# Patient Record
Sex: MALE | Race: WHITE | NOT HISPANIC OR LATINO | Employment: FULL TIME | URBAN - METROPOLITAN AREA
[De-identification: names, ages, dates, MRNs, and addresses within clinical notes are randomized per-mention and may not be internally consistent; named-entity substitution may affect disease eponyms.]

---

## 2018-01-03 DIAGNOSIS — G89.29 CHRONIC LEFT SHOULDER PAIN: Primary | ICD-10-CM

## 2018-01-03 DIAGNOSIS — M25.512 CHRONIC LEFT SHOULDER PAIN: Primary | ICD-10-CM

## 2018-01-04 ENCOUNTER — HOSPITAL ENCOUNTER (OUTPATIENT)
Dept: RADIOLOGY | Facility: HOSPITAL | Age: 23
Discharge: HOME | End: 2018-01-04
Attending: PHYSICIAN ASSISTANT
Payer: COMMERCIAL

## 2018-01-04 ENCOUNTER — OFFICE VISIT (OUTPATIENT)
Dept: ORTHOPEDICS | Facility: CLINIC | Age: 23
End: 2018-01-04
Payer: COMMERCIAL

## 2018-01-04 VITALS
HEART RATE: 80 BPM | SYSTOLIC BLOOD PRESSURE: 137 MMHG | RESPIRATION RATE: 12 BRPM | BODY MASS INDEX: 41.33 KG/M2 | DIASTOLIC BLOOD PRESSURE: 82 MMHG | HEIGHT: 71 IN | WEIGHT: 295.19 LBS

## 2018-01-04 DIAGNOSIS — G89.29 CHRONIC LEFT SHOULDER PAIN: ICD-10-CM

## 2018-01-04 DIAGNOSIS — M75.02 ADHESIVE CAPSULITIS OF LEFT SHOULDER: ICD-10-CM

## 2018-01-04 DIAGNOSIS — M79.18 MYOFASCIAL PAIN ON LEFT SIDE: Primary | ICD-10-CM

## 2018-01-04 DIAGNOSIS — M25.512 CHRONIC LEFT SHOULDER PAIN: ICD-10-CM

## 2018-01-04 PROCEDURE — 99203 OFFICE O/P NEW LOW 30 MIN: CPT | Mod: 25,S$GLB,, | Performed by: PHYSICIAN ASSISTANT

## 2018-01-04 PROCEDURE — 73030 X-RAY EXAM OF SHOULDER: CPT | Mod: 26,FY,LT, | Performed by: RADIOLOGY

## 2018-01-04 PROCEDURE — 20610 DRAIN/INJ JOINT/BURSA W/O US: CPT | Mod: LT,S$GLB,, | Performed by: PHYSICIAN ASSISTANT

## 2018-01-04 PROCEDURE — 73030 X-RAY EXAM OF SHOULDER: CPT | Mod: TC,FY,PO,LT

## 2018-01-04 PROCEDURE — 99999 PR PBB SHADOW E&M-EST. PATIENT-LVL III: CPT | Mod: PBBFAC,,, | Performed by: PHYSICIAN ASSISTANT

## 2018-01-04 RX ORDER — MELOXICAM 15 MG/1
15 TABLET ORAL DAILY
Qty: 30 TABLET | Refills: 0 | Status: SHIPPED | OUTPATIENT
Start: 2018-01-04

## 2018-01-04 RX ORDER — METHYLPREDNISOLONE ACETATE 80 MG/ML
80 INJECTION, SUSPENSION INTRA-ARTICULAR; INTRALESIONAL; INTRAMUSCULAR; SOFT TISSUE ONCE
Status: COMPLETED | OUTPATIENT
Start: 2018-01-04 | End: 2018-01-04

## 2018-01-04 RX ORDER — TRIPROLIDINE/PSEUDOEPHEDRINE 2.5MG-60MG
TABLET ORAL EVERY 6 HOURS PRN
COMMUNITY

## 2018-01-04 RX ADMIN — METHYLPREDNISOLONE ACETATE 80 MG: 80 INJECTION, SUSPENSION INTRA-ARTICULAR; INTRALESIONAL; INTRAMUSCULAR; SOFT TISSUE at 04:01

## 2018-01-04 NOTE — PROGRESS NOTES
Subjective:      Patient ID: Cristhian Karimi is a 22 y.o. male.    Chief Complaint: Pain of the Left Shoulder      HPI: Cristhian Karimi  is a 22 y.o. male who c/o Pain of the Left Shoulder   for duration of a year and a half.  He denies a specific inciting injury, but states it started bothering him after playing lacrosse more than a year ago.  Pain has been intermittent.  Quality is sharp and aching.  He complains of shoulder stiffness.  He has the inability to lift the arm overhead.  At rest, pain is 0 out of 10.  However at worst it is 10 out of 10.  It is worsened with movement as well as overhead motion, and trying to sleep at nighttime.  Improved minimally with ibuprofen over-the-counter.  He is now complaining of some pain in the trapezial area as well.  He denies radiating pain as well as numbness and tingling.    Review of Systems   Constitution: Negative for fever.   Cardiovascular: Negative for chest pain.   Respiratory: Negative for cough and shortness of breath.    Skin: Negative for rash.   Musculoskeletal: Positive for joint pain and stiffness. Negative for joint swelling.   Gastrointestinal: Negative for heartburn.   Neurological: Negative for headaches and numbness.         Objective:        General    Nursing note and vitals reviewed.  Constitutional: He is oriented to person, place, and time. He appears well-developed and well-nourished.   HENT:   Head: Normocephalic and atraumatic.   Eyes: EOM are normal.   Cardiovascular: Normal rate and regular rhythm.    Pulmonary/Chest: Effort normal and breath sounds normal.   Abdominal: Soft.   Neurological: He is alert and oriented to person, place, and time.   Psychiatric: He has a normal mood and affect. His behavior is normal.         Back (L-Spine & T-Spine) / Neck (C-Spine) Exam     Tenderness   The patient is tender to palpation of the left trapezial.       Left Shoulder Exam     Inspection/Observation   Swelling: absent  Bruising: absent  Scars:  absent  Deformity: absent  Scapular Dyskinesia: positive    Range of Motion   Active Abduction: abnormal   Passive Abduction: abnormal   Extension: abnormal   Forward Flexion: abnormal   Forward Elevation: abnormal  Adduction: normal  External Rotation 0 degrees: abnormal   External Rotation 90 degrees: abnormal  Internal Rotation 0 degrees: normal     Tests & Signs   Cross Arm: negative  Drop Arm: negative  Hawkin's test: negative  Impingement: negative  Active Compression test (Sidney's Sign): negative  Speed's Test: negative    Other   Sensation: normal     Comments:  No TTP over bicipital groove, greater tuberosity, nor ac joint.        Muscle Strength   Left Upper Extremity  Shoulder Abduction: 4/5   Shoulder Internal Rotation: 4/5   Shoulder External Rotation: 4/5     Vascular Exam       Left Pulses      Radial:                    2+      Capillary Refill  Left Hand: normal capillary refill            Xray:   Left shoulder from today images and report were reviewed today.  I agree with the radiologist's interpretation.  There is no radiographic evidence of acute osseous, articular, or soft tissue abnormality. Joint spaces are well preserved.    Assessment:       Encounter Diagnoses   Name Primary?    Chronic left shoulder pain     Adhesive capsulitis of left shoulder     Myofascial pain on left side Yes          Plan:       Cristhian was seen today for pain.    Diagnoses and all orders for this visit:    Myofascial pain on left side  -     Ambulatory Referral to Physical/Occupational Therapy    Chronic left shoulder pain  -     methylPREDNISolone acetate injection 80 mg; Inject 1 mL (80 mg total) into the articular space once.  -     meloxicam (MOBIC) 15 MG tablet; Take 1 tablet (15 mg total) by mouth once daily. Take with food  -     Ambulatory Referral to Physical/Occupational Therapy    Adhesive capsulitis of left shoulder  -     methylPREDNISolone acetate injection 80 mg; Inject 1 mL (80 mg total) into  the articular space once.  -     meloxicam (MOBIC) 15 MG tablet; Take 1 tablet (15 mg total) by mouth once daily. Take with food  -     Ambulatory Referral to Physical/Occupational Therapy    Cristhian comes in today for the above problems.  They're new problems for me.  He is quite stiff on exam consistent with adhesive capsulitis; however, he is not the typical graphic for this problem.  I would like to get him started in physical therapy for aggressive range of motion and rotator cuff strengthening.  They should also work on manual modalities for her muscular discomfort.  We have discussed risks and benefits of a glenohumeral joint injection.  He wishes to proceed with that today.  Additionally I have put him on a prescription of meloxicam as above.  If he develops GI side effects, he will discontinue it and let me know.  We'll see him back in the office in 6 weeks.  Should he have problems before then, he will notify the office.  Patient verbalizes understanding and agrees with the above plan.    Return in about 6 weeks (around 2/15/2018).    Left Shoulder (Glenohumeral Joint) Injection Report:  After verbal consent was obtained for left shoulder injection, patient ID, site, and side were verified.  The left shoulder was sterilly prepped in the standard fashion.  A 22-gauge needle was introduced into left glenohumeral space from an anterior approach without complication. The left shoulder was then injected with 20 mg lidocaine plain.  After adequate time was given for appropriate anesthesia, I reprepped the shoulder and introduced a 22-gauge needle into the glenohumeral joint space utilizing the same approach and injected 80 mg Depo-Medrol.  A sterile bandaid was applied.  The patient was informed to apply an ice pack approximately 10min once arriving home and not to do anything strenuous for 24hours. He was instructed to call if there were any problems. The patient was discharged in stable condition.    The  patient understands, chooses and consents to this plan and accepts all   the risks which include but are not limited to the risks mentioned above.     Disclaimer: This note was prepared using a voice recognition system and is likely to have sound alike errors within the text.

## 2018-02-06 ENCOUNTER — PATIENT MESSAGE (OUTPATIENT)
Dept: ORTHOPEDICS | Facility: CLINIC | Age: 23
End: 2018-02-06

## 2018-02-06 ENCOUNTER — TELEPHONE (OUTPATIENT)
Dept: ORTHOPEDICS | Facility: CLINIC | Age: 23
End: 2018-02-06

## 2018-02-06 NOTE — TELEPHONE ENCOUNTER
Called to inform him that his Orthopedic appt was rescheduled due to provider being out of office. Also sent a [employer] message.

## 2023-06-20 PROCEDURE — 99285 EMERGENCY DEPT VISIT HI MDM: CPT

## 2023-06-20 PROCEDURE — 93010 EKG 12-LEAD: ICD-10-PCS | Mod: ,,, | Performed by: INTERNAL MEDICINE

## 2023-06-20 PROCEDURE — 93010 ELECTROCARDIOGRAM REPORT: CPT | Mod: ,,, | Performed by: INTERNAL MEDICINE

## 2023-06-20 PROCEDURE — 93005 ELECTROCARDIOGRAM TRACING: CPT

## 2023-06-21 ENCOUNTER — HOSPITAL ENCOUNTER (EMERGENCY)
Facility: HOSPITAL | Age: 28
Discharge: HOME | End: 2023-06-21
Attending: EMERGENCY MEDICINE | Admitting: NURSE PRACTITIONER
Payer: COMMERCIAL

## 2023-06-21 VITALS
RESPIRATION RATE: 20 BRPM | TEMPERATURE: 98 F | HEART RATE: 91 BPM | BODY MASS INDEX: 42.46 KG/M2 | SYSTOLIC BLOOD PRESSURE: 134 MMHG | OXYGEN SATURATION: 100 % | WEIGHT: 313.5 LBS | HEIGHT: 72 IN | DIASTOLIC BLOOD PRESSURE: 81 MMHG

## 2023-06-21 DIAGNOSIS — R00.0 TACHYCARDIA: ICD-10-CM

## 2023-06-21 DIAGNOSIS — R79.89 TROPONIN LEVEL ELEVATED: Primary | ICD-10-CM

## 2023-06-21 DIAGNOSIS — R10.13 EPIGASTRIC ABDOMINAL PAIN: ICD-10-CM

## 2023-06-21 LAB
ALBUMIN SERPL BCP-MCNC: 4.6 G/DL (ref 3.5–5.2)
ALP SERPL-CCNC: 83 U/L (ref 55–135)
ALT SERPL W/O P-5'-P-CCNC: 16 U/L (ref 10–44)
AMPHET+METHAMPHET UR QL: NEGATIVE
ANION GAP SERPL CALC-SCNC: 13 MMOL/L (ref 8–16)
AST SERPL-CCNC: 31 U/L (ref 10–40)
BARBITURATES UR QL SCN>200 NG/ML: NEGATIVE
BASOPHILS # BLD AUTO: 0.03 K/UL (ref 0–0.2)
BASOPHILS NFR BLD: 0.3 % (ref 0–1.9)
BENZODIAZ UR QL SCN>200 NG/ML: NEGATIVE
BILIRUB SERPL-MCNC: 1.2 MG/DL (ref 0.1–1)
BILIRUB UR QL STRIP: NEGATIVE
BUN SERPL-MCNC: 8 MG/DL (ref 6–20)
BZE UR QL SCN: NEGATIVE
CALCIUM SERPL-MCNC: 9.6 MG/DL (ref 8.7–10.5)
CANNABINOIDS UR QL SCN: NEGATIVE
CHLORIDE SERPL-SCNC: 102 MMOL/L (ref 95–110)
CLARITY UR: CLEAR
CO2 SERPL-SCNC: 21 MMOL/L (ref 23–29)
COLOR UR: YELLOW
CREAT SERPL-MCNC: 0.8 MG/DL (ref 0.5–1.4)
CREAT UR-MCNC: 98.3 MG/DL (ref 23–375)
DIFFERENTIAL METHOD: ABNORMAL
EOSINOPHIL # BLD AUTO: 0 K/UL (ref 0–0.5)
EOSINOPHIL NFR BLD: 0.2 % (ref 0–8)
ERYTHROCYTE [DISTWIDTH] IN BLOOD BY AUTOMATED COUNT: 13.6 % (ref 11.5–14.5)
EST. GFR  (NO RACE VARIABLE): >60 ML/MIN/1.73 M^2
GLUCOSE SERPL-MCNC: 109 MG/DL (ref 70–110)
GLUCOSE UR QL STRIP: NEGATIVE
HCT VFR BLD AUTO: 43.1 % (ref 40–54)
HCV AB SERPL QL IA: NEGATIVE
HGB BLD-MCNC: 14.8 G/DL (ref 14–18)
HGB UR QL STRIP: ABNORMAL
HIV 1+2 AB+HIV1 P24 AG SERPL QL IA: NEGATIVE
IMM GRANULOCYTES # BLD AUTO: 0.02 K/UL (ref 0–0.04)
IMM GRANULOCYTES NFR BLD AUTO: 0.2 % (ref 0–0.5)
KETONES UR QL STRIP: NEGATIVE
LEUKOCYTE ESTERASE UR QL STRIP: NEGATIVE
LIPASE SERPL-CCNC: 13 U/L (ref 4–60)
LYMPHOCYTES # BLD AUTO: 1.4 K/UL (ref 1–4.8)
LYMPHOCYTES NFR BLD: 15.7 % (ref 18–48)
MCH RBC QN AUTO: 28.4 PG (ref 27–31)
MCHC RBC AUTO-ENTMCNC: 34.3 G/DL (ref 32–36)
MCV RBC AUTO: 83 FL (ref 82–98)
METHADONE UR QL SCN>300 NG/ML: NEGATIVE
MONOCYTES # BLD AUTO: 0.5 K/UL (ref 0.3–1)
MONOCYTES NFR BLD: 5.2 % (ref 4–15)
NEUTROPHILS # BLD AUTO: 6.9 K/UL (ref 1.8–7.7)
NEUTROPHILS NFR BLD: 78.4 % (ref 38–73)
NITRITE UR QL STRIP: NEGATIVE
NRBC BLD-RTO: 0 /100 WBC
OPIATES UR QL SCN: NEGATIVE
PCP UR QL SCN>25 NG/ML: NEGATIVE
PH UR STRIP: 6 [PH] (ref 5–8)
PLATELET # BLD AUTO: 304 K/UL (ref 150–450)
PMV BLD AUTO: 9.2 FL (ref 9.2–12.9)
POTASSIUM SERPL-SCNC: 4.9 MMOL/L (ref 3.5–5.1)
PROT SERPL-MCNC: 8.4 G/DL (ref 6–8.4)
PROT UR QL STRIP: NEGATIVE
RBC # BLD AUTO: 5.22 M/UL (ref 4.6–6.2)
SODIUM SERPL-SCNC: 136 MMOL/L (ref 136–145)
SP GR UR STRIP: 1.02 (ref 1–1.03)
TOXICOLOGY INFORMATION: NORMAL
TROPONIN I SERPL DL<=0.01 NG/ML-MCNC: 0.01 NG/ML (ref 0–0.03)
TROPONIN I SERPL DL<=0.01 NG/ML-MCNC: 0.36 NG/ML (ref 0–0.03)
URN SPEC COLLECT METH UR: ABNORMAL
UROBILINOGEN UR STRIP-ACNC: NEGATIVE EU/DL
WBC # BLD AUTO: 8.79 K/UL (ref 3.9–12.7)

## 2023-06-21 PROCEDURE — 85025 COMPLETE CBC W/AUTO DIFF WBC: CPT | Performed by: EMERGENCY MEDICINE

## 2023-06-21 PROCEDURE — 81003 URINALYSIS AUTO W/O SCOPE: CPT | Mod: 59 | Performed by: NURSE PRACTITIONER

## 2023-06-21 PROCEDURE — 25500020 PHARM REV CODE 255: Performed by: EMERGENCY MEDICINE

## 2023-06-21 PROCEDURE — 87389 HIV-1 AG W/HIV-1&-2 AB AG IA: CPT | Performed by: EMERGENCY MEDICINE

## 2023-06-21 PROCEDURE — 84484 ASSAY OF TROPONIN QUANT: CPT | Mod: 91 | Performed by: EMERGENCY MEDICINE

## 2023-06-21 PROCEDURE — 83690 ASSAY OF LIPASE: CPT | Performed by: NURSE PRACTITIONER

## 2023-06-21 PROCEDURE — 93010 ELECTROCARDIOGRAM REPORT: CPT | Mod: ,,, | Performed by: INTERNAL MEDICINE

## 2023-06-21 PROCEDURE — 93010 EKG 12-LEAD: ICD-10-PCS | Mod: ,,, | Performed by: INTERNAL MEDICINE

## 2023-06-21 PROCEDURE — 86803 HEPATITIS C AB TEST: CPT | Performed by: EMERGENCY MEDICINE

## 2023-06-21 PROCEDURE — 84484 ASSAY OF TROPONIN QUANT: CPT | Performed by: EMERGENCY MEDICINE

## 2023-06-21 PROCEDURE — 80307 DRUG TEST PRSMV CHEM ANLYZR: CPT | Performed by: NURSE PRACTITIONER

## 2023-06-21 PROCEDURE — 80053 COMPREHEN METABOLIC PANEL: CPT | Performed by: NURSE PRACTITIONER

## 2023-06-21 PROCEDURE — 93005 ELECTROCARDIOGRAM TRACING: CPT

## 2023-06-21 RX ADMIN — IOHEXOL 100 ML: 350 INJECTION, SOLUTION INTRAVENOUS at 03:06

## 2023-06-21 NOTE — DISCHARGE INSTRUCTIONS
Use Pepcid or Tums for symptoms.  He would mild elevation in troponin but this is normalized likely a leak due to your pain.  Please follow up with her doctor 1-2 days for re-evaluation.  Return as needed for any worsening symptoms, problems, questions concerns for

## 2023-06-21 NOTE — Clinical Note
"Cristhian Lojacharanjit Karimi was seen and treated in our emergency department on 6/20/2023.  He may return to work on 06/22/2023.       If you have any questions or concerns, please don't hesitate to call.      Nicolette WATKINS    "

## 2023-06-21 NOTE — ED PROVIDER NOTES
SCRIBE #1 NOTE: I, Olamidedamian Cloud, am scribing for, and in the presence of, Anita Rich DO. I have scribed the HPI, ROS, and PEx.     SCRIBE #2 NOTE: I, Hanna Bradshaw, am scribing for, and in the presence of,  Jarrod Aldridge Jr., MD. I have scribed the remaining portions of the note not scribed by Scribe #1.    History     Chief Complaint   Patient presents with    Abdominal Pain     Pt CO epigastric pain no N/V/D. Also CO pain to lower back. No PMH. Pt reports S&S began early this afternoon.     Review of patient's allergies indicates:  No Known Allergies      History of Present Illness     HPI    6/21/2023, 12:28 AM  History obtained from the patient      History of Present Illness: Cristhian Karimi is a 27 y.o. male patient who presents to the Emergency Department for evaluation of epigastric pain which onset around noon yesterday but worsened around 6 PM. Pt reports that he had pneumonia about 3 months ago and completed antibiotics. Pt denies any EtOH use and states that he vapes and uses marijuana occasionally. Symptoms are constant and moderate in severity. No mitigating factors reported. Pt reports that the pain is exacerbated by deep breaths. Associated sxs include nausea, vomiting, diarrhea, and back pain. Patient denies any fever, lightheadedness, and palpitations, and all other sxs at this time. No Prior Tx reported. No further complaints or concerns at this time.       Arrival mode: Personal vehicle    PCP: Primary Doctor No        Past Medical History:  Elevated blood pressure    Past Surgical History:  Right arm and left ankle    Family History:  Family History   Problem Relation Age of Onset    Cancer Father        Social History:  Vapes and uses marijuana occasionally.  Denies alcohol use.       Review of Systems     Review of Systems   Constitutional:  Negative for fever.   Respiratory:  Negative for shortness of breath.    Cardiovascular:  Negative for palpitations.   Gastrointestinal:  Positive  for abdominal pain (epigastric), diarrhea, nausea and vomiting.   Musculoskeletal:  Positive for back pain.   Neurological:  Negative for light-headedness.      Physical Exam     Initial Vitals [06/20/23 2348]   BP Pulse Resp Temp SpO2   (!) 154/80 (!) 123 16 98.3 °F (36.8 °C) 100 %      MAP       --          Physical Exam  Nursing Notes and Vital Signs Reviewed.  Constitutional: Patient is in no acute distress. Well-developed and well-nourished.  Head: Atraumatic. Normocephalic.  Eyes: PERRL. EOM intact. No scleral icterus.  ENT: Mucous membranes are moist.  Neck: Supple. Full ROM. No JVD.  Cardiovascular: Regular rate. Regular rhythm. No murmurs, rubs, or gallops. Distal pulses are 2+ and symmetric.  Pulmonary/Chest: No respiratory distress. Clear to auscultation bilaterally. No wheezing or rales.  Abdominal: Soft and non-distended.  There is no tenderness.  No rebound, guarding, or rigidity. Good bowel sounds.  Genitourinary: No CVA tenderness  Musculoskeletal: Moves all extremities. No obvious deformities. No edema. No calf tenderness.  Skin: Warm and dry.  Neurological:  Alert, awake, and appropriate.  Normal speech.  No acute focal neurological deficits are appreciated.  Psychiatric: Normal affect. Good eye contact. Appropriate in content.     ED Course   Critical Care    Date/Time: 6/21/2023 4:28 AM  Performed by: Jarrod Aldridge Jr., MD  Authorized by: Jarrod Aldridge Jr., MD   Direct patient critical care time: 17 minutes  Additional history critical care time: 9 minutes  Ordering / reviewing critical care time: 6 minutes  Documentation critical care time: 4 minutes  Consulting other physicians critical care time: 4 minutes  Total critical care time (exclusive of procedural time) : 40 minutes  Critical care time was exclusive of separately billable procedures and treating other patients and teaching time.  Critical care was necessary to treat or prevent imminent or life-threatening deterioration of the  following conditions: elevated troponin.  Critical care was time spent personally by me on the following activities: blood draw for specimens, development of treatment plan with patient or surrogate, discussions with consultants, interpretation of cardiac output measurements, evaluation of patient's response to treatment, examination of patient, obtaining history from patient or surrogate, ordering and performing treatments and interventions, ordering and review of laboratory studies, ordering and review of radiographic studies, pulse oximetry, re-evaluation of patient's condition and review of old charts.      ED Vital Signs:  Vitals:    06/20/23 2345 06/20/23 2348 06/21/23 0012 06/21/23 0123   BP:  (!) 154/80 (!) 174/99 (!) 153/86   Pulse:  (!) 123 (!) 115 89   Resp:  16  20   Temp:  98.3 °F (36.8 °C)     TempSrc: Oral Oral     SpO2:  100% 100% 99%   Weight:  (!) 142.2 kg (313 lb 7.9 oz)     Height:  6' (1.829 m)      06/21/23 0232 06/21/23 0333 06/21/23 0403 06/21/23 0503   BP: (!) 159/94 (!) 173/87 136/66 134/72   Pulse: 96 101 80 90   Resp: (!) 22 (!) 24 (!) 21 (!) 22   Temp:       TempSrc:       SpO2: 98% 99% 97% 99%   Weight:       Height:           Abnormal Lab Results:  Labs Reviewed   COMPREHENSIVE METABOLIC PANEL - Abnormal; Notable for the following components:       Result Value    CO2 21 (*)     Total Bilirubin 1.2 (*)     All other components within normal limits    Narrative:     Release to patient->Immediate   URINALYSIS, REFLEX TO URINE CULTURE - Abnormal; Notable for the following components:    Occult Blood UA Trace (*)     All other components within normal limits    Narrative:     Specimen Source->Urine   CBC W/ AUTO DIFFERENTIAL - Abnormal; Notable for the following components:    Gran % 78.4 (*)     Lymph % 15.7 (*)     All other components within normal limits   TROPONIN I - Abnormal; Notable for the following components:    Troponin I 0.360 (*)     All other components within normal limits     Narrative:     Release to patient->Immediate   HIV 1 / 2 ANTIBODY    Narrative:     Release to patient->Immediate   HEPATITIS C ANTIBODY    Narrative:     Release to patient->Immediate   LIPASE    Narrative:     Release to patient->Immediate   TROPONIN I   TROPONIN I   DRUG SCREEN PANEL, URINE EMERGENCY   HEP C VIRUS HOLD SPECIMEN   DRUG SCREEN PANEL, URINE EMERGENCY        All Lab Results:  Results for orders placed or performed during the hospital encounter of 06/21/23   HIV 1/2 Ag/Ab (4th Gen)   Result Value Ref Range    HIV 1/2 Ag/Ab Negative Negative   Hepatitis C Antibody   Result Value Ref Range    Hepatitis C Ab Negative Negative   Comprehensive metabolic panel   Result Value Ref Range    Sodium 136 136 - 145 mmol/L    Potassium 4.9 3.5 - 5.1 mmol/L    Chloride 102 95 - 110 mmol/L    CO2 21 (L) 23 - 29 mmol/L    Glucose 109 70 - 110 mg/dL    BUN 8 6 - 20 mg/dL    Creatinine 0.8 0.5 - 1.4 mg/dL    Calcium 9.6 8.7 - 10.5 mg/dL    Total Protein 8.4 6.0 - 8.4 g/dL    Albumin 4.6 3.5 - 5.2 g/dL    Total Bilirubin 1.2 (H) 0.1 - 1.0 mg/dL    Alkaline Phosphatase 83 55 - 135 U/L    AST 31 10 - 40 U/L    ALT 16 10 - 44 U/L    eGFR >60 >60 mL/min/1.73 m^2    Anion Gap 13 8 - 16 mmol/L   Urinalysis, Reflex to Urine Culture Urine, Clean Catch    Specimen: Urine   Result Value Ref Range    Specimen UA Urine, Clean Catch     Color, UA Yellow Yellow, Straw, Beatris    Appearance, UA Clear Clear    pH, UA 6.0 5.0 - 8.0    Specific Gravity, UA 1.020 1.005 - 1.030    Protein, UA Negative Negative    Glucose, UA Negative Negative    Ketones, UA Negative Negative    Bilirubin (UA) Negative Negative    Occult Blood UA Trace (A) Negative    Nitrite, UA Negative Negative    Urobilinogen, UA Negative <2.0 EU/dL    Leukocytes, UA Negative Negative   Lipase   Result Value Ref Range    Lipase 13 4 - 60 U/L   CBC auto differential   Result Value Ref Range    WBC 8.79 3.90 - 12.70 K/uL    RBC 5.22 4.60 - 6.20 M/uL    Hemoglobin 14.8  14.0 - 18.0 g/dL    Hematocrit 43.1 40.0 - 54.0 %    MCV 83 82 - 98 fL    MCH 28.4 27.0 - 31.0 pg    MCHC 34.3 32.0 - 36.0 g/dL    RDW 13.6 11.5 - 14.5 %    Platelets 304 150 - 450 K/uL    MPV 9.2 9.2 - 12.9 fL    Immature Granulocytes 0.2 0.0 - 0.5 %    Gran # (ANC) 6.9 1.8 - 7.7 K/uL    Immature Grans (Abs) 0.02 0.00 - 0.04 K/uL    Lymph # 1.4 1.0 - 4.8 K/uL    Mono # 0.5 0.3 - 1.0 K/uL    Eos # 0.0 0.0 - 0.5 K/uL    Baso # 0.03 0.00 - 0.20 K/uL    nRBC 0 0 /100 WBC    Gran % 78.4 (H) 38.0 - 73.0 %    Lymph % 15.7 (L) 18.0 - 48.0 %    Mono % 5.2 4.0 - 15.0 %    Eosinophil % 0.2 0.0 - 8.0 %    Basophil % 0.3 0.0 - 1.9 %    Differential Method Automated    Troponin I   Result Value Ref Range    Troponin I 0.360 (H) 0.000 - 0.026 ng/mL   Troponin I   Result Value Ref Range    Troponin I 0.008 0.000 - 0.026 ng/mL             Imaging Results:  Imaging Results              CTA Chest Abdomen Pelvis (In process)  Result time 06/21/23 02:22:33   Procedure changed from CTA Chest Non-Coronary (PE Studies)                    X-Ray Chest AP Portable (In process)                    Type of Interpretation: Outside Written Report.  Radiology Procedure Done: Chest CT with Contrast.  Interpretation: No evidence of pulmonary emboli or acute cardiopulmonary process.   Radiologist: Montrell Lambret MD    Type of Interpretation: Outside Written Report.  Radiology Procedure Done: Abdomen and Pelvis CT with Contrast.  Interpretation: No acute findings in the abdomen or pelvis. Splenomegaly.   Radiologist: Montrell Lambert MD    The EKG was ordered, reviewed, and independently interpreted by the ED provider.  Interpretation time: 23:54  Rate: 122 BPM  Rhythm: Sinus tachycardia with occasional premature ventricular complexes  Interpretation: Right bundle branch block. Inferior infarct, age undetermined. Possible anterolateral infarct, age undetermined. Abnormal ECG. No STEMI.      The EKG was ordered, reviewed, and independently interpreted by  "the ED provider.  Interpretation time: 00:43  Rate: 97 BPM  Rhythm: normal sinus rhythm  Interpretation: Right bundle branch block. Abnormal ECG. No STEMI.           The Emergency Provider reviewed the vital signs and test results, which are outlined above.     ED Discussion     2:00 AM: Dr. Rich transfers care of patient to Dr. Aldridge pending radiology results.     3:07 AM: Dr. Aldridge agrees with Dr. Rich's assessment and plan of care. Evaluated pt. Pt is resting comfortably and is in no acute distress.  D/w pt all pertinent results. D/w pt any concerns expressed at this time. Answered all questions. Pt expresses understanding at this time.    4:33 AM: Discussed pt's case with Dr. Henderson ([facility]) who recommends repeat troponin and tox screen. Dr. Henderson says "if trop down trending and tox positive, would d/c. if trop trending up and tox negative, will obs". Dr. Henderson adds "he vapes and smokes marijuana so may have been laced taken additional drugs without telling us.  His vitals were suspicious for drug use given his fairly negative workup except for his troponin".     5:18 AM: Initial Trop 0.360. Repeat Trop 0.008.    5:19 AM: Discussed pt's case with Dr. Henderson ([facility]) who recommends discharge with outpatient follow-up.    5:21 AM: Reassessed pt at this time. Discussed with pt all pertinent ED information and results. Discussed pt dx and plan of tx. Gave pt all f/u and return to the ED instructions. All questions and concerns were addressed at this time. Pt expresses understanding of information and instructions, and is comfortable with plan to discharge. Pt is stable for discharge.    I discussed with patient and/or family/caretaker that evaluation in the ED does not suggest any emergent or life threatening medical conditions requiring immediate intervention beyond what was provided in the ED, and I believe patient is safe for discharge.  Regardless, an unremarkable evaluation in " the ED does not preclude the development or presence of a serious of life threatening condition. As such, patient was instructed to return immediately for any worsening or change in current symptoms.      ED Course as of 06/21/23 0536   Wed Jun 21, 2023   0002 EKG shows sinus tachycardia with occasional PVCs.  IN interval 124.   consistent with a right bundle-branch block. [NF]   0049 Repeat EKG shows normal sinus rhythm with a rate of 97.  Pr interval 144.   consistent with a right bundle-branch block.  .  No ST or T-wave changes. [NF]      ED Course User Index  [NF] Anita Rich DO     Medical Decision Making:   Initial Assessment:   Patient presents complaining of epigastric pain radiating to his back.  He has no prior cardiac history that this may marijuana.  Physical exam is benign.  Differential Diagnosis:   GERD, chest pain, ASCVD, PE, dissection, pancreatitis  Clinical Tests:   Lab Tests: Ordered and Reviewed  Radiological Study: Ordered and Reviewed  Medical Tests: Ordered and Reviewed  ED Management:  Patient was evaluated history physical examination.  EKG was ordered interpreted by the prior physician.  This was read as a right bundle branch block with sinus tachycardia.  Blood work was obtained.  Patient had normal CBC, normal lipase normal CMP and normal UA.  He had a troponin that was initially at 0.3 however repeat was at 0.008.  Discussed this case with Hospital Medicine.  They recommend discharge home after drug screen.  Patient is pain-free.  Clinically the patient has acid reflux will treat as such.  Patient verbalized understanding agreement with the plan of care seems reliable.  Safe discharge my opinion.         ED Medication(s):  Medications   iohexoL (OMNIPAQUE 350) injection 100 mL (100 mLs Intravenous Given 6/21/23 4715)       New Prescriptions    No medications on file        Follow-up Information       PCP In 1 day.                                 Scribe  Attestation:   Scribe #1: I performed the above scribed service and the documentation accurately describes the services I performed. I attest to the accuracy of the note.     Attending:   Physician Attestation Statement for Scribe #1: I, Anita Rich DO, personally performed the services described in this documentation, as scribed by Olamide Cloud, in my presence, and it is both accurate and complete.       Scribe Attestation:   Scribe #2: I performed the above scribed service and the documentation accurately describes the services I performed. I attest to the accuracy of the note.    Attending Attestation:           Physician Attestation for Scribe:    Physician Attestation Statement for Scribe #2: I, Jarrod Aldridge Jr., MD, reviewed documentation, as scribed by Hanna Bradshaw in my presence, and it is both accurate and complete. I also acknowledge and confirm the content of the note done by Scribe #1.         Clinical Impression       ICD-10-CM ICD-9-CM   1. Troponin level elevated  R77.8 790.6   2. Epigastric abdominal pain  R10.13 789.06   3. Tachycardia  R00.0 785.0       Disposition:   Disposition: Discharged  Condition: Stable      Jarrod Aldridge Jr., MD  06/21/23 0550

## 2023-06-21 NOTE — FIRST PROVIDER EVALUATION
Medical screening examination initiated.  I have conducted a focused provider triage encounter, findings are as follows:    Brief history of present illness:  27-year-old male presents to ER complaining of epigastric abdominal pain and low back pain that started at noon today and has progressively become worse.  Denies nausea, vomiting, or diarrhea.  Denies chest pain or shortness of breath.  No treatment prior to arrival.    Vitals:    06/20/23 2345 06/20/23 2348   BP:  (!) 154/80   BP Location:  Right arm   Patient Position:  Sitting   Pulse:  (!) 123   Resp:  16   Temp:  98.3 °F (36.8 °C)   TempSrc: Oral Oral   SpO2:  100%   Weight:  (!) 142.2 kg (313 lb 7.9 oz)   Height:  6' (1.829 m)       Pertinent physical exam:  No acute distress    Brief workup plan:  Labs and EKG    Preliminary workup initiated; this workup will be continued and followed by the physician or advanced practice provider that is assigned to the patient when roomed.

## 2023-06-22 ENCOUNTER — OFFICE VISIT (OUTPATIENT)
Dept: INTERNAL MEDICINE | Facility: CLINIC | Age: 28
End: 2023-06-22
Payer: COMMERCIAL

## 2023-06-22 VITALS
HEIGHT: 72 IN | HEART RATE: 83 BPM | BODY MASS INDEX: 41.51 KG/M2 | TEMPERATURE: 97 F | OXYGEN SATURATION: 98 % | SYSTOLIC BLOOD PRESSURE: 126 MMHG | DIASTOLIC BLOOD PRESSURE: 70 MMHG | WEIGHT: 306.44 LBS

## 2023-06-22 DIAGNOSIS — R79.89 ELEVATED TROPONIN: ICD-10-CM

## 2023-06-22 DIAGNOSIS — R10.13 EPIGASTRIC PAIN: Primary | ICD-10-CM

## 2023-06-22 DIAGNOSIS — R89.9 ABNORMAL LABORATORY TEST: ICD-10-CM

## 2023-06-22 DIAGNOSIS — Z80.0 FAMILY HISTORY OF COLON CANCER: ICD-10-CM

## 2023-06-22 PROCEDURE — 1159F MED LIST DOCD IN RCRD: CPT | Mod: CPTII,S$GLB,, | Performed by: FAMILY MEDICINE

## 2023-06-22 PROCEDURE — 1160F RVW MEDS BY RX/DR IN RCRD: CPT | Mod: CPTII,S$GLB,, | Performed by: FAMILY MEDICINE

## 2023-06-22 PROCEDURE — 99204 OFFICE O/P NEW MOD 45 MIN: CPT | Mod: S$GLB,,, | Performed by: FAMILY MEDICINE

## 2023-06-22 PROCEDURE — 1159F PR MEDICATION LIST DOCUMENTED IN MEDICAL RECORD: ICD-10-PCS | Mod: CPTII,S$GLB,, | Performed by: FAMILY MEDICINE

## 2023-06-22 PROCEDURE — 99999 PR PBB SHADOW E&M-EST. PATIENT-LVL IV: CPT | Mod: PBBFAC,,, | Performed by: FAMILY MEDICINE

## 2023-06-22 PROCEDURE — 3008F PR BODY MASS INDEX (BMI) DOCUMENTED: ICD-10-PCS | Mod: CPTII,S$GLB,, | Performed by: FAMILY MEDICINE

## 2023-06-22 PROCEDURE — 3078F DIAST BP <80 MM HG: CPT | Mod: CPTII,S$GLB,, | Performed by: FAMILY MEDICINE

## 2023-06-22 PROCEDURE — 3008F BODY MASS INDEX DOCD: CPT | Mod: CPTII,S$GLB,, | Performed by: FAMILY MEDICINE

## 2023-06-22 PROCEDURE — 99999 PR PBB SHADOW E&M-EST. PATIENT-LVL IV: ICD-10-PCS | Mod: PBBFAC,,, | Performed by: FAMILY MEDICINE

## 2023-06-22 PROCEDURE — 3074F SYST BP LT 130 MM HG: CPT | Mod: CPTII,S$GLB,, | Performed by: FAMILY MEDICINE

## 2023-06-22 PROCEDURE — 99204 PR OFFICE/OUTPT VISIT, NEW, LEVL IV, 45-59 MIN: ICD-10-PCS | Mod: S$GLB,,, | Performed by: FAMILY MEDICINE

## 2023-06-22 PROCEDURE — 3074F PR MOST RECENT SYSTOLIC BLOOD PRESSURE < 130 MM HG: ICD-10-PCS | Mod: CPTII,S$GLB,, | Performed by: FAMILY MEDICINE

## 2023-06-22 PROCEDURE — 3078F PR MOST RECENT DIASTOLIC BLOOD PRESSURE < 80 MM HG: ICD-10-PCS | Mod: CPTII,S$GLB,, | Performed by: FAMILY MEDICINE

## 2023-06-22 PROCEDURE — 1160F PR REVIEW ALL MEDS BY PRESCRIBER/CLIN PHARMACIST DOCUMENTED: ICD-10-PCS | Mod: CPTII,S$GLB,, | Performed by: FAMILY MEDICINE

## 2023-06-22 RX ORDER — PANTOPRAZOLE SODIUM 40 MG/1
40 TABLET, DELAYED RELEASE ORAL DAILY
Qty: 30 TABLET | Refills: 1 | Status: SHIPPED | OUTPATIENT
Start: 2023-06-22 | End: 2023-08-15

## 2023-06-22 RX ORDER — PANTOPRAZOLE SODIUM 40 MG/1
40 TABLET, DELAYED RELEASE ORAL
COMMUNITY
End: 2023-06-22 | Stop reason: SDUPTHER

## 2023-06-22 NOTE — PROGRESS NOTES
Cristhian Karimi  06/22/2023  [id number]    Primary Doctor No  Patient Care Team:  Primary Doctor No as PCP - General          Visit Type: ER follow up    Chief Complaint:  Chief Complaint   Patient presents with    Follow-up     From ER        History of Present Illness:  Went to ER for Epigastric pain  Had positive Troponin 0.360, then repeat at .008  Discharged  EKG done in ER  He does vape and smoke Marijuana.   Utox was negative.  CTA negative  Dicsharged with normal troponin and dx of Reflux.    He had pain mid epigastric and stomach area.  He has had this before. He said it was uncomfortable to lay down.  Pressure type pain.  Harder to take a deep breath.    Dad had colon cancer at 40  Grandfather had it.  He does need to establish with GI for early screen due to family history.        History:  Past Medical History:   Diagnosis Date    Childhood asthma     Depression      History reviewed. No pertinent surgical history.  Family History   Problem Relation Age of Onset    Cancer Father     Colon cancer Paternal Grandfather     Esophageal cancer Paternal Grandfather      Social History     Socioeconomic History    Marital status: Single   Tobacco Use    Smoking status: Every Day     Types: Vaping with nicotine    Smokeless tobacco: Never   Substance and Sexual Activity    Alcohol use: Not Currently    Drug use: Yes     Types: Marijuana    Sexual activity: Not Currently     There is no problem list on file for this patient.    Review of patient's allergies indicates:  No Known Allergies    The following were reviewed at this visit: active problem list, medication list, allergies, family history, social history, and health maintenance.    Medications:  Current Outpatient Medications on File Prior to Visit   Medication Sig Dispense Refill    ibuprofen (ADVIL,MOTRIN) 100 mg/5 mL suspension Take by mouth every 6 (six) hours as needed for Temperature greater than.      meloxicam (MOBIC) 15 MG tablet Take 1 tablet (15 mg  total) by mouth once daily. Take with food (Patient not taking: Reported on 6/22/2023) 30 tablet 0    [DISCONTINUED] pantoprazole (PROTONIX) 40 MG tablet Take 40 mg by mouth.       No current facility-administered medications on file prior to visit.       Medications have been reviewed and reconciled with patient at this visit.  Barriers to medications reviewed with patient.    Adverse reactions to current medications reviewed with patient..    Over the counter medications reviewed and reconciled with patient.    Exam:  Wt Readings from Last 3 Encounters:   06/22/23 (!) 139 kg (306 lb 7 oz)   06/20/23 (!) 142.2 kg (313 lb 7.9 oz)   01/04/18 133.9 kg (295 lb 3.1 oz)     Temp Readings from Last 3 Encounters:   06/22/23 97.3 °F (36.3 °C) (Tympanic)   06/21/23 98.1 °F (36.7 °C) (Oral)   02/02/12 100.2 °F (37.9 °C)     BP Readings from Last 3 Encounters:   06/22/23 126/70   06/21/23 134/81   01/04/18 137/82     Pulse Readings from Last 3 Encounters:   06/22/23 83   06/21/23 91   01/04/18 80     Body mass index is 41.56 kg/m².      Review of Systems   Gastrointestinal:  Positive for abdominal pain and heartburn.   Physical Exam  Nursing note reviewed.   Cardiovascular:      Rate and Rhythm: Normal rate and regular rhythm.   Pulmonary:      Effort: Pulmonary effort is normal. No respiratory distress.   Neurological:      Mental Status: He is alert and oriented to person, place, and time.   Psychiatric:         Mood and Affect: Mood normal.         Behavior: Behavior normal.         Thought Content: Thought content normal.         Judgment: Judgment normal.       Laboratory Reviewed ({Yes)  Lab Results   Component Value Date    WBC 8.79 06/21/2023    HGB 14.8 06/21/2023    HCT 43.1 06/21/2023     06/21/2023    CHOL 127 08/01/2011    TRIG 80 08/01/2011    HDL 46 08/01/2011    ALT 16 06/21/2023    AST 31 06/21/2023     06/21/2023    K 4.9 06/21/2023     06/21/2023    CREATININE 0.8 06/21/2023    BUN 8  06/21/2023    CO2 21 (L) 06/21/2023    TSH 2.77 08/01/2011       Cristhian was seen today for follow-up.    Diagnoses and all orders for this visit:    Epigastric pain  -     pantoprazole (PROTONIX) 40 MG tablet; Take 1 tablet (40 mg total) by mouth once daily.  -     Ambulatory referral/consult to Gastroenterology; Future    Abnormal laboratory test  -     pantoprazole (PROTONIX) 40 MG tablet; Take 1 tablet (40 mg total) by mouth once daily.    Elevated troponin  -     Ambulatory referral/consult to Cardiology; Future  -     Echo; Future    Family history of colon cancer  -     Ambulatory referral/consult to Gastroenterology; Future      Plan for PPI for now  EDG in future, will schedule with GI   Need early Colon screen     Echo due to troponin  Had elevated when also had pneumonia  Could be post tachycardia, but echo to look for any right heart strain    Incidental splenomegaly, CBC and Platelet fine    Avoid spicey food, red based          Care Plan/Goals: Reviewed    Goals    None         Follow up: No follow-ups on file.    After visit summary was printed and given to patient upon discharge today.  Patient goals and care plan are included in After Visit Summary.

## 2023-06-29 ENCOUNTER — DOCUMENTATION ONLY (OUTPATIENT)
Dept: CARDIOLOGY | Facility: HOSPITAL | Age: 28
End: 2023-06-29
Payer: COMMERCIAL

## 2023-06-29 ENCOUNTER — HOSPITAL ENCOUNTER (OUTPATIENT)
Dept: CARDIOLOGY | Facility: HOSPITAL | Age: 28
Discharge: HOME | End: 2023-06-29
Attending: FAMILY MEDICINE
Payer: COMMERCIAL

## 2023-06-29 VITALS
HEART RATE: 87 BPM | WEIGHT: 306 LBS | HEIGHT: 72 IN | BODY MASS INDEX: 41.45 KG/M2 | SYSTOLIC BLOOD PRESSURE: 126 MMHG | DIASTOLIC BLOOD PRESSURE: 70 MMHG

## 2023-06-29 DIAGNOSIS — R79.89 ELEVATED TROPONIN: ICD-10-CM

## 2023-06-29 LAB
AORTIC ROOT ANNULUS: 3.55 CM
AV INDEX (PROSTH): 0.75
AV MEAN GRADIENT: 9 MMHG
AV PEAK GRADIENT: 15 MMHG
AV REGURGITATION PRESSURE HALF TIME: 442.02 MS
AV VALVE AREA: 2.64 CM2
AV VELOCITY RATIO: 0.77
BSA FOR ECHO PROCEDURE: 2.66 M2
CV ECHO LV RWT: 0.6 CM
DOP CALC AO PEAK VEL: 1.91 M/S
DOP CALC AO VTI: 39.1 CM
DOP CALC LVOT AREA: 3.5 CM2
DOP CALC LVOT DIAMETER: 2.12 CM
DOP CALC LVOT PEAK VEL: 1.47 M/S
DOP CALC LVOT STROKE VOLUME: 103.37 CM3
DOP CALC RVOT PEAK VEL: 1.14 M/S
DOP CALC RVOT VTI: 24.8 CM
DOP CALCLVOT PEAK VEL VTI: 29.3 CM
E WAVE DECELERATION TIME: 183.65 MSEC
E/A RATIO: 1.43
E/E' RATIO: 8.55 M/S
ECHO LV POSTERIOR WALL: 1.18 CM (ref 0.6–1.1)
EJECTION FRACTION: 60 %
FRACTIONAL SHORTENING: 38 % (ref 28–44)
INTERVENTRICULAR SEPTUM: 1.17 CM (ref 0.6–1.1)
IVRT: 91.34 MSEC
LA MAJOR: 4.48 CM
LA MINOR: 4.34 CM
LEFT ATRIUM SIZE: 3.43 CM
LEFT ATRIUM VOLUME INDEX MOD: 13 ML/M2
LEFT ATRIUM VOLUME MOD: 33.15 CM3
LEFT INTERNAL DIMENSION IN SYSTOLE: 2.46 CM (ref 2.1–4)
LEFT VENTRICLE DIASTOLIC VOLUME INDEX: 26.45 ML/M2
LEFT VENTRICLE DIASTOLIC VOLUME: 67.44 ML
LEFT VENTRICLE MASS INDEX: 61 G/M2
LEFT VENTRICLE SYSTOLIC VOLUME INDEX: 8.4 ML/M2
LEFT VENTRICLE SYSTOLIC VOLUME: 21.39 ML
LEFT VENTRICULAR INTERNAL DIMENSION IN DIASTOLE: 3.94 CM (ref 3.5–6)
LEFT VENTRICULAR MASS: 156.76 G
LV LATERAL E/E' RATIO: 8.27 M/S
LV SEPTAL E/E' RATIO: 8.86 M/S
LVOT MG: 5.23 MMHG
LVOT MV: 1.07 CM/S
MV PEAK A VEL: 0.87 M/S
MV PEAK E VEL: 1.24 M/S
MV STENOSIS PRESSURE HALF TIME: 53.26 MS
MV VALVE AREA P 1/2 METHOD: 4.13 CM2
PISA AR MAX VEL: 2.51 M/S
PISA TR MAX VEL: 2.05 M/S
PULM VEIN S/D RATIO: 0.79
PV MEAN GRADIENT: 3.36 MMHG
PV PEAK D VEL: 0.72 M/S
PV PEAK S VEL: 0.57 M/S
PV PEAK VELOCITY: 1.45 CM/S
RA MAJOR: 4.13 CM
RA WIDTH: 3.19 CM
RIGHT VENTRICULAR END-DIASTOLIC DIMENSION: 3.36 CM
SINUS: 2.63 CM
STJ: 2.47 CM
TDI LATERAL: 0.15 M/S
TDI SEPTAL: 0.14 M/S
TDI: 0.15 M/S
TR MAX PG: 17 MMHG

## 2023-06-29 PROCEDURE — C8929 TTE W OR WO FOL WCON,DOPPLER: HCPCS

## 2023-06-29 PROCEDURE — 25500020 PHARM REV CODE 255: Performed by: FAMILY MEDICINE

## 2023-06-29 PROCEDURE — 93306 TTE W/DOPPLER COMPLETE: CPT | Mod: 26,,, | Performed by: INTERNAL MEDICINE

## 2023-06-29 PROCEDURE — 93306 ECHO (CUPID ONLY): ICD-10-PCS | Mod: 26,,, | Performed by: INTERNAL MEDICINE

## 2023-06-29 RX ADMIN — HUMAN ALBUMIN MICROSPHERES AND PERFLUTREN 0.66 MG: 10; .22 INJECTION, SOLUTION INTRAVENOUS at 09:06

## 2023-06-29 NOTE — PROGRESS NOTES
Pt presented for an echocardiogram today.  This study was performed in conjunction with Optison contrast agent because of poor endocardial visualization.  Procedure was explained to the patient, he verbalized understanding and signed the consent.   Administered a total of 3 ml of Optison (lot # [id number], expiration date 8/1/24).  Patient tolerated the procedure well.

## 2023-06-30 ENCOUNTER — OFFICE VISIT (OUTPATIENT)
Dept: CARDIOLOGY | Facility: CLINIC | Age: 28
End: 2023-06-30
Payer: COMMERCIAL

## 2023-06-30 VITALS
HEIGHT: 72 IN | WEIGHT: 298.31 LBS | HEART RATE: 105 BPM | BODY MASS INDEX: 40.4 KG/M2 | OXYGEN SATURATION: 98 % | DIASTOLIC BLOOD PRESSURE: 82 MMHG | SYSTOLIC BLOOD PRESSURE: 128 MMHG

## 2023-06-30 DIAGNOSIS — I45.10 RBBB: ICD-10-CM

## 2023-06-30 DIAGNOSIS — R79.89 ELEVATED TROPONIN: ICD-10-CM

## 2023-06-30 PROCEDURE — 99999 PR PBB SHADOW E&M-EST. PATIENT-LVL III: CPT | Mod: PBBFAC,,, | Performed by: INTERNAL MEDICINE

## 2023-06-30 PROCEDURE — 3079F PR MOST RECENT DIASTOLIC BLOOD PRESSURE 80-89 MM HG: ICD-10-PCS | Mod: CPTII,S$GLB,, | Performed by: INTERNAL MEDICINE

## 2023-06-30 PROCEDURE — 1160F RVW MEDS BY RX/DR IN RCRD: CPT | Mod: CPTII,S$GLB,, | Performed by: INTERNAL MEDICINE

## 2023-06-30 PROCEDURE — 3079F DIAST BP 80-89 MM HG: CPT | Mod: CPTII,S$GLB,, | Performed by: INTERNAL MEDICINE

## 2023-06-30 PROCEDURE — 3008F PR BODY MASS INDEX (BMI) DOCUMENTED: ICD-10-PCS | Mod: CPTII,S$GLB,, | Performed by: INTERNAL MEDICINE

## 2023-06-30 PROCEDURE — 3008F BODY MASS INDEX DOCD: CPT | Mod: CPTII,S$GLB,, | Performed by: INTERNAL MEDICINE

## 2023-06-30 PROCEDURE — 3074F PR MOST RECENT SYSTOLIC BLOOD PRESSURE < 130 MM HG: ICD-10-PCS | Mod: CPTII,S$GLB,, | Performed by: INTERNAL MEDICINE

## 2023-06-30 PROCEDURE — 99205 OFFICE O/P NEW HI 60 MIN: CPT | Mod: S$GLB,,, | Performed by: INTERNAL MEDICINE

## 2023-06-30 PROCEDURE — 99205 PR OFFICE/OUTPT VISIT, NEW, LEVL V, 60-74 MIN: ICD-10-PCS | Mod: S$GLB,,, | Performed by: INTERNAL MEDICINE

## 2023-06-30 PROCEDURE — 1160F PR REVIEW ALL MEDS BY PRESCRIBER/CLIN PHARMACIST DOCUMENTED: ICD-10-PCS | Mod: CPTII,S$GLB,, | Performed by: INTERNAL MEDICINE

## 2023-06-30 PROCEDURE — 1159F MED LIST DOCD IN RCRD: CPT | Mod: CPTII,S$GLB,, | Performed by: INTERNAL MEDICINE

## 2023-06-30 PROCEDURE — 99999 PR PBB SHADOW E&M-EST. PATIENT-LVL III: ICD-10-PCS | Mod: PBBFAC,,, | Performed by: INTERNAL MEDICINE

## 2023-06-30 PROCEDURE — 3074F SYST BP LT 130 MM HG: CPT | Mod: CPTII,S$GLB,, | Performed by: INTERNAL MEDICINE

## 2023-06-30 PROCEDURE — 1159F PR MEDICATION LIST DOCUMENTED IN MEDICAL RECORD: ICD-10-PCS | Mod: CPTII,S$GLB,, | Performed by: INTERNAL MEDICINE

## 2023-06-30 NOTE — PROGRESS NOTES
Subjective:   Patient ID:  Cristhian Karimi is a 27 y.o. male who presents for follow-up of No chief complaint on file.  Pt seen in ER for abdominal pain, tachycardia.  EKG NSR, RBBB, there was mild increase in troponin.  Echo is normal  CT in here nml  Pt with pneumonia 3-23  HPI    Review of Systems   Constitutional: Negative. Negative for weight gain.   HENT: Negative.     Eyes: Negative.    Cardiovascular: Negative.  Negative for chest pain, leg swelling and palpitations.   Respiratory: Negative.  Negative for shortness of breath.    Endocrine: Negative.    Hematologic/Lymphatic: Negative.    Skin: Negative.    Musculoskeletal:  Negative for muscle weakness.   Gastrointestinal: Negative.    Genitourinary: Negative.    Neurological: Negative.  Negative for dizziness.   Psychiatric/Behavioral: Negative.     Allergic/Immunologic: Negative.    All other systems reviewed and are negative.  Family History   Problem Relation Age of Onset    Cancer Father     Colon cancer Paternal Grandfather     Esophageal cancer Paternal Grandfather      Past Medical History:   Diagnosis Date    Childhood asthma     Depression      Social History     Socioeconomic History    Marital status: Single   Tobacco Use    Smoking status: Every Day     Types: Vaping with nicotine    Smokeless tobacco: Never   Substance and Sexual Activity    Alcohol use: Not Currently    Drug use: Yes     Types: Marijuana    Sexual activity: Not Currently     Current Outpatient Medications on File Prior to Visit   Medication Sig Dispense Refill    pantoprazole (PROTONIX) 40 MG tablet Take 1 tablet (40 mg total) by mouth once daily. 30 tablet 1    ibuprofen (ADVIL,MOTRIN) 100 mg/5 mL suspension Take by mouth every 6 (six) hours as needed for Temperature greater than.      meloxicam (MOBIC) 15 MG tablet Take 1 tablet (15 mg total) by mouth once daily. Take with food (Patient not taking: Reported on 6/22/2023) 30 tablet 0     No current facility-administered  medications on file prior to visit.     Review of patient's allergies indicates:  No Known Allergies    Objective:     Physical Exam  Vitals and nursing note reviewed.   Constitutional:       Appearance: He is well-developed.   HENT:      Head: Normocephalic and atraumatic.   Eyes:      Conjunctiva/sclera: Conjunctivae normal.      Pupils: Pupils are equal, round, and reactive to light.   Cardiovascular:      Rate and Rhythm: Normal rate and regular rhythm.      Pulses: Intact distal pulses.      Heart sounds: Normal heart sounds.   Pulmonary:      Effort: Pulmonary effort is normal.      Breath sounds: Normal breath sounds.   Abdominal:      General: Bowel sounds are normal.      Palpations: Abdomen is soft.   Musculoskeletal:      Cervical back: Normal range of motion and neck supple.   Skin:     General: Skin is warm and dry.   Neurological:      Mental Status: He is alert and oriented to person, place, and time.       Assessment:     1. Elevated troponin    2. RBBB        Plan:     Elevated troponin  -     Ambulatory referral/consult to Cardiology    RBBB        Continue risk factor modification  No further w/u

## 2023-08-02 ENCOUNTER — OFFICE VISIT (OUTPATIENT)
Dept: GASTROENTEROLOGY | Facility: CLINIC | Age: 28
End: 2023-08-02
Payer: COMMERCIAL

## 2023-08-02 VITALS
HEIGHT: 72 IN | DIASTOLIC BLOOD PRESSURE: 85 MMHG | SYSTOLIC BLOOD PRESSURE: 129 MMHG | HEART RATE: 93 BPM | BODY MASS INDEX: 40.31 KG/M2 | WEIGHT: 297.63 LBS

## 2023-08-02 DIAGNOSIS — R10.13 EPIGASTRIC ABDOMINAL PAIN: ICD-10-CM

## 2023-08-02 DIAGNOSIS — Z80.0 FAMILY HISTORY OF COLON CANCER: ICD-10-CM

## 2023-08-02 DIAGNOSIS — R10.13 EPIGASTRIC PAIN: ICD-10-CM

## 2023-08-02 PROCEDURE — 1160F PR REVIEW ALL MEDS BY PRESCRIBER/CLIN PHARMACIST DOCUMENTED: ICD-10-PCS | Mod: CPTII,S$GLB,, | Performed by: INTERNAL MEDICINE

## 2023-08-02 PROCEDURE — 3074F PR MOST RECENT SYSTOLIC BLOOD PRESSURE < 130 MM HG: ICD-10-PCS | Mod: CPTII,S$GLB,, | Performed by: INTERNAL MEDICINE

## 2023-08-02 PROCEDURE — 99204 PR OFFICE/OUTPT VISIT, NEW, LEVL IV, 45-59 MIN: ICD-10-PCS | Mod: S$GLB,,, | Performed by: INTERNAL MEDICINE

## 2023-08-02 PROCEDURE — 1160F RVW MEDS BY RX/DR IN RCRD: CPT | Mod: CPTII,S$GLB,, | Performed by: INTERNAL MEDICINE

## 2023-08-02 PROCEDURE — 99999 PR PBB SHADOW E&M-EST. PATIENT-LVL IV: ICD-10-PCS | Mod: PBBFAC,,, | Performed by: INTERNAL MEDICINE

## 2023-08-02 PROCEDURE — 99999 PR PBB SHADOW E&M-EST. PATIENT-LVL IV: CPT | Mod: PBBFAC,,, | Performed by: INTERNAL MEDICINE

## 2023-08-02 PROCEDURE — 1159F PR MEDICATION LIST DOCUMENTED IN MEDICAL RECORD: ICD-10-PCS | Mod: CPTII,S$GLB,, | Performed by: INTERNAL MEDICINE

## 2023-08-02 PROCEDURE — 99204 OFFICE O/P NEW MOD 45 MIN: CPT | Mod: S$GLB,,, | Performed by: INTERNAL MEDICINE

## 2023-08-02 PROCEDURE — 3074F SYST BP LT 130 MM HG: CPT | Mod: CPTII,S$GLB,, | Performed by: INTERNAL MEDICINE

## 2023-08-02 PROCEDURE — 1159F MED LIST DOCD IN RCRD: CPT | Mod: CPTII,S$GLB,, | Performed by: INTERNAL MEDICINE

## 2023-08-02 PROCEDURE — 3079F PR MOST RECENT DIASTOLIC BLOOD PRESSURE 80-89 MM HG: ICD-10-PCS | Mod: CPTII,S$GLB,, | Performed by: INTERNAL MEDICINE

## 2023-08-02 PROCEDURE — 3008F BODY MASS INDEX DOCD: CPT | Mod: CPTII,S$GLB,, | Performed by: INTERNAL MEDICINE

## 2023-08-02 PROCEDURE — 3008F PR BODY MASS INDEX (BMI) DOCUMENTED: ICD-10-PCS | Mod: CPTII,S$GLB,, | Performed by: INTERNAL MEDICINE

## 2023-08-02 PROCEDURE — 3079F DIAST BP 80-89 MM HG: CPT | Mod: CPTII,S$GLB,, | Performed by: INTERNAL MEDICINE

## 2023-08-02 NOTE — PROGRESS NOTES
Clinic Consult:  [facility] Consultation Note    Reason for Consult:  Diagnoses of Epigastric pain, Family history of colon cancer, and Epigastric abdominal pain were pertinent to this visit.    PCP: Primary Doctor No   [id number] / [place] [id number]    HPI:  This is a 27 y.o. male here for evaluation of the following:     //Abdominal pain  -It has been occurring for 1.5 years   -Its located in the epigastric area.   -It occurs once every 6 months   -He was started on PPI. No symptoms     //Family history of colon cancer   -Dad diagnosed with colon cancer at age 42.     Grandfather with esophageal cancer.         ROS:  As per HPI       Medical History:   Past Medical History:   Diagnosis Date    Childhood asthma     Depression        Surgical History:  History reviewed. No pertinent surgical history.    Family History:   Family History   Problem Relation Age of Onset    Cancer Father     Colon cancer Paternal Grandfather     Esophageal cancer Paternal Grandfather        Social History:   Social History     Tobacco Use    Smoking status: Every Day     Current packs/day: 0.00     Types: Vaping with nicotine    Smokeless tobacco: Never   Substance Use Topics    Alcohol use: Not Currently    Drug use: Yes     Types: Marijuana       Allergies: Reviewed    Home Medications:   Medication List with Changes/Refills   Current Medications    IBUPROFEN (ADVIL,MOTRIN) 100 MG/5 ML SUSPENSION    Take by mouth every 6 (six) hours as needed for Temperature greater than.    MELOXICAM (MOBIC) 15 MG TABLET    Take 1 tablet (15 mg total) by mouth once daily. Take with food    PANTOPRAZOLE (PROTONIX) 40 MG TABLET    Take 1 tablet (40 mg total) by mouth once daily.         Physical Exam:  Vital Signs:  /85   Pulse 93   Ht 6' (1.829 m)   Wt 135 kg (297 lb 9.9 oz)   BMI 40.36 kg/m²   Body mass index is 40.36 kg/m².    Physical Exam  Constitutional:       Appearance: Normal appearance. He is obese.    HENT:      Head: Normocephalic.   Eyes:      Conjunctiva/sclera: Conjunctivae normal.   Pulmonary:      Effort: Pulmonary effort is normal.   Abdominal:      General: There is no distension.      Palpations: Abdomen is soft.      Tenderness: There is no abdominal tenderness. There is no guarding.   Neurological:      Mental Status: He is alert.          Labs: Pertinent labs reviewed.        Assessment:  Problem List Items Addressed This Visit          Oncology    Family history of colon cancer    Current Assessment & Plan     Plan:   -He will need a colonoscopy at age 30            GI    Epigastric abdominal pain    Current Assessment & Plan     Resolved.   Suspect symptoms are related to GERD.   If they return and are more frequent,can consider starting on a PPI or H2 blocker   Continue weight loss   GERD lifestyle modifications           Other Visit Diagnoses       Epigastric pain              Epigastric pain  -     Ambulatory referral/consult to Gastroenterology    Family history of colon cancer  -     Ambulatory referral/consult to Gastroenterology    Epigastric abdominal pain                No follow-ups on file.    Order summary:  No orders of the defined types were placed in this encounter.      Thank you so much for allowing me to participate in the care of Cristhian Gonzalez MD  Gastroenterology and Hepatology  [facility]

## 2023-08-02 NOTE — ASSESSMENT & PLAN NOTE
Resolved.   Suspect symptoms are related to GERD.   If they return and are more frequent,can consider starting on a PPI or H2 blocker   Continue weight loss   GERD lifestyle modifications

## 2023-08-15 DIAGNOSIS — R89.9 ABNORMAL LABORATORY TEST: ICD-10-CM

## 2023-08-15 DIAGNOSIS — R10.13 EPIGASTRIC PAIN: ICD-10-CM

## 2023-08-15 RX ORDER — PANTOPRAZOLE SODIUM 40 MG/1
40 TABLET, DELAYED RELEASE ORAL
Qty: 90 TABLET | Refills: 3 | Status: SHIPPED | OUTPATIENT
Start: 2023-08-15

## 2023-08-15 NOTE — TELEPHONE ENCOUNTER
Refill Routing Note     Refill Routing Note   Medication(s) are not appropriate for processing by [facility] for the following reason(s):      New or recently adjusted medication  Responsible provider unclear    [facility] action(s):  Defer Care Due:  None identified     Medication Therapy Plan: no pcp listed on profile      Appointments  past 12m or future 3m with PCP    Date Provider   Last Visit   6/22/2023 Susannah Garcia MD   Next Visit   Visit date not found Susannah Garcia MD   ED visits in past 90 days: 1        Note composed:6:23 AM 08/15/2023

## 2024-10-21 DIAGNOSIS — R89.9 ABNORMAL LABORATORY TEST: ICD-10-CM

## 2024-10-21 DIAGNOSIS — R10.13 EPIGASTRIC PAIN: ICD-10-CM

## 2024-10-21 RX ORDER — PANTOPRAZOLE SODIUM 40 MG/1
40 TABLET, DELAYED RELEASE ORAL
Qty: 90 TABLET | Refills: 0 | OUTPATIENT
Start: 2024-10-21

## 2024-10-21 NOTE — TELEPHONE ENCOUNTER
Refill Routing Note   Medication(s) are not appropriate for processing by [facility] for the following reason(s):        Responsible provider unclear  Patient not seen by provider within 15 months    [facility] action(s):  Defer        Medication Therapy Plan: Unable to assess. Patient does not have a PCP or participating provider listed in EPIC. Will defer to last known prescriber for review.      Appointments  past 12m or future 3m with PCP    Date Provider   Last Visit   6/22/2023 Susannah Garcia MD   Next Visit   Visit date not found Susannah Garcia MD   ED visits in past 90 days: 0        Note composed:6:29 AM 10/21/2024

## 2024-11-05 ENCOUNTER — PATIENT MESSAGE (OUTPATIENT)
Dept: RESEARCH | Facility: HOSPITAL | Age: 29
End: 2024-11-05
Payer: COMMERCIAL